# Patient Record
Sex: MALE | Race: WHITE | URBAN - METROPOLITAN AREA
[De-identification: names, ages, dates, MRNs, and addresses within clinical notes are randomized per-mention and may not be internally consistent; named-entity substitution may affect disease eponyms.]

---

## 2017-11-13 ENCOUNTER — OFFICE VISIT (OUTPATIENT)
Dept: FAMILY MEDICINE | Facility: CLINIC | Age: 27
End: 2017-11-13
Payer: COMMERCIAL

## 2017-11-13 VITALS
HEART RATE: 72 BPM | SYSTOLIC BLOOD PRESSURE: 130 MMHG | HEIGHT: 76 IN | TEMPERATURE: 98.4 F | DIASTOLIC BLOOD PRESSURE: 80 MMHG | WEIGHT: 224 LBS | BODY MASS INDEX: 27.28 KG/M2

## 2017-11-13 DIAGNOSIS — K92.1 BLOOD IN STOOL: Primary | ICD-10-CM

## 2017-11-13 PROCEDURE — 99203 OFFICE O/P NEW LOW 30 MIN: CPT | Performed by: FAMILY MEDICINE

## 2017-11-13 NOTE — MR AVS SNAPSHOT
After Visit Summary   11/13/2017    Micah Crooks    MRN: [id number]           Patient Information     Date Of Birth          1990        Visit Information        Provider Department      11/13/2017 11:20 AM Jovani Patel MD [facility]        Today's Diagnoses     Blood in stool    -  1       Follow-ups after your visit        Additional Services     COLORECTAL SURGERY REFERRAL       Your provider has referred you to: [facility]: [facility]  [phone]  [email or web address]    Referral Reason(s): Rectal Bleeding  Special Concerns: None  This referral is: Urgent (24 - 72 hours)  It is OK to leave a message on patient's voicemail.    Please be aware that coverage of these services is subject to the terms and limitations of your health insurance plan.  Call member services at your health plan with any benefit or coverage questions.      Please bring the following with you to your appointment:    (1) Any X-Rays, CTs or MRIs which have been performed.  Contact the facility where they were done to arrange for  prior to your scheduled appointment.    (2) List of current medications  (3) This referral request   (4) Any documents/labs given to you for this referral                  Your next 10 appointments already scheduled     Nov 13, 2017 11:20 AM CST   Office Visit with Jovani Patel MD   [facility] ([facility])    [address] [place] [id number] [phone]           Bring a current list of meds and any records pertaining to this visit. For Physicals, please bring immunization records and any forms needing to be filled out. Please arrive 10 minutes early to complete paperwork.              Who to contact     If you have questions or need follow up information about today's clinic visit or your schedule please contact [facility] directly at  "[phone].  Normal or non-critical lab and imaging results will be communicated to you by MyChart, letter or phone within 4 business days after the clinic has received the results. If you do not hear from us within 7 days, please contact the clinic through [facility] or phone. If you have a critical or abnormal lab result, we will notify you by phone as soon as possible.  Submit refill requests through [facility] or call your pharmacy and they will forward the refill request to us. Please allow 3 business days for your refill to be completed.          Additional Information About Your Visit        [employer] Information     [facility] lets you send messages to your doctor, view your test results, renew your prescriptions, schedule appointments and more. To sign up, go to [email or web address] . Click on \"Log in\" on the left side of the screen, which will take you to the Welcome page. Then click on \"Sign up Now\" on the right side of the page.     You will be asked to enter the access code listed below, as well as some personal information. Please follow the directions to create your username and password.     Your access code is: [email or web address]  Expires: 2018 11:19 AM     Your access code will  in 90 days. If you need help or a new code, please call your [facility] clinic or [phone].        Care EveryWhere ID     This is your Care EveryWhere ID. This could be used by other organizations to access your [facility] medical records  [id number]        Your Vitals Were     Pulse Temperature Height BMI (Body Mass Index)          72 98.4  F (36.9  C) (Tympanic) 6' 3.59\" (1.92 m) 27.56 kg/m2         Blood Pressure from Last 3 Encounters:   17 130/80    Weight from Last 3 Encounters:   17 224 lb (101.6 kg)              We Performed the Following     COLORECTAL SURGERY REFERRAL        Primary Care Provider Office Phone # Fax #    [facility] [phone]       [address] [place] [address] [address]  [facility] [id number]        Equal Access to Services     HARRIS DEAL : Hadii belen Crockett, suleman duvall, gualberto renee, ronnell gonzales. So [facility] [phone].    ATENCIÓN: Si habla español, tiene a martínez disposición servicios gratuitos de asistencia lingüística. Llame al [phone].    We comply with applicable federal civil rights laws and [place] laws. We do not discriminate on the basis of race, color, national origin, age, disability, sex, sexual orientation, or gender identity.            Thank you!     Thank you for choosing [facility]  for your care. Our goal is always to provide you with excellent care. Hearing back from our patients is one way we can continue to improve our services. Please take a few minutes to complete the written survey that you may receive in the mail after your visit with us. Thank you!             Your Updated Medication List - Protect others around you: Learn how to safely use, store and throw away your medicines at www.disposemymeds.org.      Notice  As of 11/13/2017 11:19 AM    You have not been prescribed any medications.

## 2017-11-13 NOTE — NURSING NOTE
"Chief Complaint   Patient presents with     Rectal Problem     blood in stool        Initial /80  Pulse 72  Temp 98.4  F (36.9  C) (Tympanic)  Ht 6' 3.59\" (1.92 m)  Wt 224 lb (101.6 kg)  BMI 27.56 kg/m2 Estimated body mass index is 27.56 kg/(m^2) as calculated from the following:    Height as of this encounter: 6' 3.59\" (1.92 m).    Weight as of this encounter: 224 lb (101.6 kg).  Medication Reconciliation: complete    No current outpatient prescriptions on file.       Fransisco ELMORE CMA  "

## 2017-11-13 NOTE — PROGRESS NOTES
"  SUBJECTIVE:   Micah Crooks is a 27 year old male who presents to clinic today for the following health issues:      Blood in stool  Onset: one month ago - on and off   Started on month ago. No abdominal pain.  No nausea, no vomiting. No abdominal cramping and no weight loss.  He denies any constipation.    Description:   Pain: no   Itching: no     Accompanying Signs & Symptoms:  Blood streaked toilet paper: YES  Blood in stool: YES  Changes in stool pattern: no     History:   Any previous GI studies done:none  Family History of colon cancer: no       Therapies Tried and outcome: none          Problem list and histories reviewed & adjusted, as indicated.  Additional history: as documented    There is no problem list on file for this patient.    No past surgical history on file.    Social History   Substance Use Topics     Smoking status: Never Smoker     Smokeless tobacco: Never Used     Alcohol use Yes     History reviewed. No pertinent family history.      No current outpatient prescriptions on file.         Reviewed and updated as needed this visit by clinical staffTobacco  Allergies  Meds  Fam Hx  Soc Hx      Reviewed and updated as needed this visit by Provider         ROS:  C: NEGATIVE for fever, chills, change in weight  E/M: NEGATIVE for ear, mouth and throat problems  R: NEGATIVE for significant cough or SOB  CV: NEGATIVE for chest pain, palpitations or peripheral edema    OBJECTIVE:                                                    /80  Pulse 72  Temp 98.4  F (36.9  C) (Tympanic)  Ht 6' 3.59\" (1.92 m)  Wt 224 lb (101.6 kg)  BMI 27.56 kg/m2  Body mass index is 27.56 kg/(m^2).   GENERAL: healthy, alert, well nourished, well hydrated, no distress  Rectal exam done, no apparent external Hemorid. No pari blood, slight painful rectal exam.     ASSESSMENT/PLAN:                                                        ICD-10-CM    1. Blood in stool K92.1 COLORECTAL SURGERY REFERRAL     The patient " rectal exam did not indicate any external hemorrhoid. No obvious fissure appreciated. He is advised to have evaluation by colorectal surgery  for further evaluation   He does not have any warning symptoms. Discussed with him if any focal tenderness along with nausea vomiting he may need to see more urgently.  Differential discussed with the patient including internal hemorrhoid or small fissures which is not apparent on clinical exam.  Jovani Patel MD  [facility]